# Patient Record
Sex: FEMALE | Race: WHITE
[De-identification: names, ages, dates, MRNs, and addresses within clinical notes are randomized per-mention and may not be internally consistent; named-entity substitution may affect disease eponyms.]

---

## 2021-01-19 ENCOUNTER — HOSPITAL ENCOUNTER (INPATIENT)
Dept: HOSPITAL 95 - OBS | Age: 36
LOS: 2 days | Discharge: HOME | End: 2021-01-21
Admitting: OBSTETRICS & GYNECOLOGY
Payer: COMMERCIAL

## 2021-01-19 VITALS — WEIGHT: 270.55 LBS | BODY MASS INDEX: 42.46 KG/M2 | HEIGHT: 67.01 IN

## 2021-01-19 DIAGNOSIS — E66.01: ICD-10-CM

## 2021-01-19 DIAGNOSIS — O48.0: Primary | ICD-10-CM

## 2021-01-19 DIAGNOSIS — D25.9: ICD-10-CM

## 2021-01-19 DIAGNOSIS — O34.13: ICD-10-CM

## 2021-01-19 DIAGNOSIS — Z3A.41: ICD-10-CM

## 2021-01-19 LAB
BASOPHILS # BLD AUTO: 0.01 K/MM3 (ref 0–0.23)
BASOPHILS NFR BLD AUTO: 0 % (ref 0–2)
DEPRECATED RDW RBC AUTO: 43.5 FL (ref 35.1–46.3)
EOSINOPHIL # BLD AUTO: 0.05 K/MM3 (ref 0–0.68)
EOSINOPHIL NFR BLD AUTO: 0 % (ref 0–6)
ERYTHROCYTE [DISTWIDTH] IN BLOOD BY AUTOMATED COUNT: 13.2 % (ref 11.7–14.2)
HCT VFR BLD AUTO: 34.6 % (ref 33–51)
HGB BLD-MCNC: 12.1 G/DL (ref 11.5–16)
IMM GRANULOCYTES # BLD AUTO: 0.06 K/MM3 (ref 0–0.1)
IMM GRANULOCYTES NFR BLD AUTO: 1 % (ref 0–1)
LYMPHOCYTES # BLD AUTO: 2.04 K/MM3 (ref 0.84–5.2)
LYMPHOCYTES NFR BLD AUTO: 18 % (ref 21–46)
MCHC RBC AUTO-ENTMCNC: 35 G/DL (ref 31.5–36.5)
MCV RBC AUTO: 89 FL (ref 80–100)
MONOCYTES # BLD AUTO: 0.7 K/MM3 (ref 0.16–1.47)
MONOCYTES NFR BLD AUTO: 6 % (ref 4–13)
NEUTROPHILS # BLD AUTO: 8.28 K/MM3 (ref 1.96–9.15)
NEUTROPHILS NFR BLD AUTO: 74 % (ref 41–73)
NRBC # BLD AUTO: 0 K/MM3 (ref 0–0.02)
NRBC BLD AUTO-RTO: 0 /100 WBC (ref 0–0.2)
PLATELET # BLD AUTO: 218 K/MM3 (ref 150–400)

## 2021-01-19 PROCEDURE — A9270 NON-COVERED ITEM OR SERVICE: HCPCS

## 2021-01-19 PROCEDURE — 3E0P7VZ INTRODUCTION OF HORMONE INTO FEMALE REPRODUCTIVE, VIA NATURAL OR ARTIFICIAL OPENING: ICD-10-PCS | Performed by: STUDENT IN AN ORGANIZED HEALTH CARE EDUCATION/TRAINING PROGRAM

## 2021-01-20 PROCEDURE — 3E033VJ INTRODUCTION OF OTHER HORMONE INTO PERIPHERAL VEIN, PERCUTANEOUS APPROACH: ICD-10-PCS | Performed by: STUDENT IN AN ORGANIZED HEALTH CARE EDUCATION/TRAINING PROGRAM

## 2021-01-20 PROCEDURE — 3E0R3BZ INTRODUCTION OF ANESTHETIC AGENT INTO SPINAL CANAL, PERCUTANEOUS APPROACH: ICD-10-PCS | Performed by: ANESTHESIOLOGY

## 2021-01-20 PROCEDURE — 00HU33Z INSERTION OF INFUSION DEVICE INTO SPINAL CANAL, PERCUTANEOUS APPROACH: ICD-10-PCS | Performed by: ANESTHESIOLOGY

## 2021-01-20 PROCEDURE — 0KQM0ZZ REPAIR PERINEUM MUSCLE, OPEN APPROACH: ICD-10-PCS | Performed by: STUDENT IN AN ORGANIZED HEALTH CARE EDUCATION/TRAINING PROGRAM

## 2021-01-20 PROCEDURE — 10907ZC DRAINAGE OF AMNIOTIC FLUID, THERAPEUTIC FROM PRODUCTS OF CONCEPTION, VIA NATURAL OR ARTIFICIAL OPENING: ICD-10-PCS | Performed by: STUDENT IN AN ORGANIZED HEALTH CARE EDUCATION/TRAINING PROGRAM

## 2021-01-21 NOTE — NUR
BREASTFEEDING ASSIST
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 NIPPLES DO NO
STAND OUT AND SHE IS HAVING DIFFICULTY LATCHING. DEMONSTRATED CORRECT SHIELD
PLACEMENT AS WELL AS WEANING TEACHING . MOM TO PRACTICE WKITH OUT SHIELD
AFTER 5 MINUTES AT BREAST WITH SHIELD. MOM VERY LONVING WITH BABY.

## 2021-01-21 NOTE — NUR
DISCHARGE PAPERWORK GIVEN TO PATIENT AND INSTRUCTIONS REVIEWED. PT DENIES ANY
FURTHER QUESTIONS OR CONCERNS AT THIS TIME. RN REVIEWED BOARDER STATUS WITH
PATIENT AND SHE VERBALIZED UNDERSTANDING.

## 2021-01-21 NOTE — NUR
NOTIFIED DR. DOS SANTOS REGARDING LOWER BP THIS MORNING. NO NEW ORDERS AT THIS TIME.
CONT TO MONITOR PT.

## 2022-06-01 ENCOUNTER — HOSPITAL ENCOUNTER (OUTPATIENT)
Dept: HOSPITAL 95 - LAB SHORT | Age: 37
Discharge: HOME | End: 2022-06-01
Attending: ADVANCED PRACTICE MIDWIFE
Payer: COMMERCIAL

## 2022-06-01 DIAGNOSIS — Z01.419: Primary | ICD-10-CM

## 2022-06-01 PROCEDURE — G0123 SCREEN CERV/VAG THIN LAYER: HCPCS

## 2022-06-03 LAB — OTHER STN SPEC: (no result)
